# Patient Record
Sex: MALE | Race: WHITE | NOT HISPANIC OR LATINO | Employment: FULL TIME | URBAN - NONMETROPOLITAN AREA
[De-identification: names, ages, dates, MRNs, and addresses within clinical notes are randomized per-mention and may not be internally consistent; named-entity substitution may affect disease eponyms.]

---

## 2019-05-30 ENCOUNTER — OFFICE VISIT (OUTPATIENT)
Dept: SLEEP MEDICINE | Facility: HOSPITAL | Age: 34
End: 2019-05-30

## 2019-05-30 VITALS
BODY MASS INDEX: 38.75 KG/M2 | SYSTOLIC BLOOD PRESSURE: 146 MMHG | HEART RATE: 90 BPM | HEIGHT: 69 IN | OXYGEN SATURATION: 98 % | WEIGHT: 261.6 LBS | DIASTOLIC BLOOD PRESSURE: 94 MMHG

## 2019-05-30 DIAGNOSIS — F51.04 PSYCHOPHYSIOLOGICAL INSOMNIA: ICD-10-CM

## 2019-05-30 DIAGNOSIS — G47.33 OBSTRUCTIVE SLEEP APNEA, ADULT: Primary | ICD-10-CM

## 2019-05-30 PROCEDURE — 99204 OFFICE O/P NEW MOD 45 MIN: CPT | Performed by: INTERNAL MEDICINE

## 2019-05-30 RX ORDER — TRAZODONE HYDROCHLORIDE 150 MG/1
150 TABLET ORAL DAILY
Refills: 2 | COMMUNITY
Start: 2019-03-27

## 2019-05-30 RX ORDER — AMLODIPINE BESYLATE 5 MG/1
5 TABLET ORAL DAILY
Refills: 2 | COMMUNITY
Start: 2019-05-03

## 2019-05-30 NOTE — PROGRESS NOTES
New Patient Sleep Medicine Consultation    Encounter Date: 5/30/2019         Patient's PCP: Veronica Thompson APRN  Referring provider: No ref. provider found  Reason for consultation chief complaint: snoring, excessive daytime sleepiness, unrefreshing sleep and insomnia    Nils Vora is a 33 y.o. male who admits to snoring, unrestful sleep, High blod pressure, decreased libido, working night shift or rotataing shifts, Disturbed or restless sleep, Up to the bathroom at night, night sweats, restless legs at night, difficulty falling asleep and takes medicine to help go to sleep. He denies cataplexy, sleep paralysis, or hypnagogic hallucinations. His bedtime is ~ 0530. He  falls asleep after 60 minutes, and is up 3-5 times per night. He wakes up ~ 8793-1077. He endorses 5 hours of sleep. He drinks 0 cups of coffee, 0 teas, and 1 sodas per day. He drinks 14 alcoholic beverages per week. He is a current smoker. He has been on trazodone in the past. He has no sleepiness with driving. He does not normally nap.    [place] - 4    No past medical history on file.  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  2 brothers and 3 sisters  Other family history + for: none listed  Smoking history: smoked tobacco once monthly  FH of sleep disorders: None listed    Review of Systems:  Constitutional: negative  Eyes: negative  Ears, nose, mouth, throat, and face: negative  Respiratory: negative  Cardiovascular: negative  Gastrointestinal: negative  Genitourinary:negative  Integument/breast: negative  Hematologic/lymphatic: negative  Musculoskeletal:negative  Neurological: negative  Behavioral/Psych: negative  Endocrine: negative  Allergic/Immunologic: negative Patient advised to discuss any  "positive ROS with PCP.      Vitals:    05/30/19 1300   BP: 146/94   Pulse: 90   SpO2: 98%           05/30/19  1300   Weight: 119 kg (261 lb 9.6 oz)       Body mass index is 38.63 kg/m². Patient's Body mass index is 38.63 kg/m². BMI is above normal parameters. Recommendations include: referral to primary care.  Neck circumference: 17.5\"          General: Alert. Cooperative. Well developed. No acute distress.             Head:  Normocephalic. Symmetrical. Atraumatic.              Eyes: Sclera clear. No icterus. PERRLA. Normal EOM.             Ears: No deformities. Normal hearing.             Nose: No septal deviation. No drainage.          Throat: No oral lesions. No thrush. Moist mucous membranes. Trachea midline    Tongue is enlarged    Dentition is fair       Pharynx: Posterior pharyngeal pillars are narrow    Mallampati score of IV (only hard palate visible)    Pharynx is normal with unrermarkable tonsils   Chest Wall:  Normal shape. Symmetric expansion with respiration. No tenderness.          Lungs:  Clear to auscultation bilaterally. No wheezes. No rhonchi. No rales. Respirations regular, even and unlabored.            Heart:  Regular rhythm and normal rate. Normal S1 and S2. No murmur.     Abdomen:  Soft, non-tender and non-distended. Normal bowel sounds. No masses.  Extremities:  Moves all extremities well. No edema.           Pulses: Pulses palpable and equal bilaterally.               Skin: Dry. Intact. No bleeding. No rash.           Neuro: Moves all 4 extremities and cranial nerves grossly intact.  Psychiatric: Normal mood and affect.      Current Outpatient Medications:   •  amLODIPine (NORVASC) 5 MG tablet, Take 5 mg by mouth Daily., Disp: , Rfl: 2  •  traZODone (DESYREL) 150 MG tablet, Take 150 mg by mouth Daily., Disp: , Rfl: 2    WBC   Date Value Ref Range Status   04/25/2015 13.8 (H) 3.2 - 9.8 x1000/uL Final     RBC   Date Value Ref Range Status   04/25/2015 5.45 4.37 - 5.74 heri/mm3 Final "     Hemoglobin   Date Value Ref Range Status   04/25/2015 17.3 13.7 - 17.3 gm/dl Final     Hematocrit   Date Value Ref Range Status   04/25/2015 49.5 (H) 39.0 - 49.0 % Final     MCV   Date Value Ref Range Status   04/25/2015 90.8 80.0 - 98.0 fl Final     MCH   Date Value Ref Range Status   04/25/2015 31.7 26.0 - 34.0 pg Final     MCHC   Date Value Ref Range Status   04/25/2015 34.9 31.5 - 36.3 gm/dl Final     RDW   Date Value Ref Range Status   04/25/2015 12.5 11.5 - 14.5 % Final     MPV   Date Value Ref Range Status   04/25/2015 10.4 8.0 - 12.0 fl Final     Platelets   Date Value Ref Range Status   04/25/2015 205 150 - 450 x1000/mm3 Final     Neutrophil Rel %   Date Value Ref Range Status   04/25/2015 83.0 (H) 37.0 - 80.0 % Final     Lymphocyte Rel %   Date Value Ref Range Status   04/25/2015 9.1 (L) 10.0 - 50.0 % Final     Monocyte Rel %   Date Value Ref Range Status   04/25/2015 6.4 0.0 - 12.0 % Final     Eosinophil Rel %   Date Value Ref Range Status   04/25/2015 1.0 0.0 - 7.0 % Final     Basophil Rel %   Date Value Ref Range Status   04/25/2015 0.2 0.0 - 2.0 % Final     Immature Granulocyte Rel %   Date Value Ref Range Status   04/25/2015 0.30 0.00 - 0.50 % Final     Neutrophils Absolute   Date Value Ref Range Status   04/25/2015 11.41 (H) 2.00 - 8.60 x1000/uL Final     Lymphocytes Absolute   Date Value Ref Range Status   04/25/2015 1.25 0.60 - 4.20 x1000/uL Final     Monocytes Absolute   Date Value Ref Range Status   04/25/2015 0.88 0.00 - 0.90 x1000/uL Final     Eosinophils Absolute   Date Value Ref Range Status   04/25/2015 0.14 0.00 - 0.70 x1000/uL Final     Basophils Absolute   Date Value Ref Range Status   04/25/2015 0.03 0.00 - 0.20 x1000/uL Final     Immature Granulocytes Absolute   Date Value Ref Range Status   04/25/2015 0.040 (H) 0.005 - 0.022 x1000/uL Final       ASSESSMENT:  1. Obstructive sleep apnea New, further workup planned (4)  1. Check home sleep study   2. Insomnia - onset and  maintenance  1. Reduce alcohol  3. Poor sleep hygiene  1. tv off  2. Consistency couch or bed    RTC in 3 months         This document has been electronically signed by Aj Granda MD on May 30, 2019         CC: Veronica Thompson, TANIYA          No ref. provider found

## 2019-07-09 ENCOUNTER — HOSPITAL ENCOUNTER (OUTPATIENT)
Dept: SLEEP MEDICINE | Facility: HOSPITAL | Age: 34
End: 2019-07-09